# Patient Record
Sex: MALE | Race: WHITE | Employment: FULL TIME | URBAN - METROPOLITAN AREA
[De-identification: names, ages, dates, MRNs, and addresses within clinical notes are randomized per-mention and may not be internally consistent; named-entity substitution may affect disease eponyms.]

---

## 2019-11-03 ENCOUNTER — HOSPITAL ENCOUNTER (EMERGENCY)
Facility: CLINIC | Age: 39
Discharge: HOME | End: 2019-11-03
Attending: EMERGENCY MEDICINE | Admitting: EMERGENCY MEDICINE
Payer: COMMERCIAL

## 2019-11-03 VITALS
HEART RATE: 66 BPM | TEMPERATURE: 98.1 F | WEIGHT: 216.49 LBS | OXYGEN SATURATION: 98 % | SYSTOLIC BLOOD PRESSURE: 140 MMHG | RESPIRATION RATE: 18 BRPM | DIASTOLIC BLOOD PRESSURE: 80 MMHG

## 2019-11-03 DIAGNOSIS — Z23 RABIES, NEED FOR PROPHYLACTIC VACCINATION AGAINST: Primary | ICD-10-CM

## 2019-11-03 PROCEDURE — 90375 RABIES IG IM/SC: CPT | Performed by: EMERGENCY MEDICINE

## 2019-11-03 PROCEDURE — 96372 THER/PROPH/DIAG INJ SC/IM: CPT

## 2019-11-03 PROCEDURE — 25000128 H RX IP 250 OP 636: Performed by: EMERGENCY MEDICINE

## 2019-11-03 PROCEDURE — 90675 RABIES VACCINE IM: CPT | Performed by: EMERGENCY MEDICINE

## 2019-11-03 PROCEDURE — 99284 EMERGENCY DEPT VISIT MOD MDM: CPT | Mod: 25

## 2019-11-03 PROCEDURE — 90471 IMMUNIZATION ADMIN: CPT

## 2019-11-03 RX ADMIN — RABIES IMMUNE GLOBULIN (HUMAN) 1950 UNITS: 300 INJECTION, SOLUTION INFILTRATION; INTRAMUSCULAR at 15:27

## 2019-11-03 RX ADMIN — Medication 1 ML: at 15:28

## 2019-11-03 ASSESSMENT — ENCOUNTER SYMPTOMS: WOUND: 1

## 2019-11-03 NOTE — ED AVS SNAPSHOT
[facility] Emergency Department  [address]  [facility] [address]  Phone:  [phone]  Fax:  [phone]                                    Vel Layton   MRN: [id number]    Department:  [facility] Emergency Department   Date of Visit:  11/3/2019           After Visit Summary Signature Page    I have received my discharge instructions, and my questions have been answered. I have discussed any challenges I see with this plan with the nurse or doctor.    ..........................................................................................................................................  Patient/Patient Representative Signature      ..........................................................................................................................................  Patient Representative Print Name and Relationship to Patient    ..................................................               ................................................  Date                                   Time    ..........................................................................................................................................  Reviewed by Signature/Title    ...................................................              ..............................................  Date                                               Time          22EPIC Rev 08/18

## 2019-11-03 NOTE — ED TRIAGE NOTES
Patient was bitten on right hand by a dog yesterday. Was seen at urgent care and had wound taken care of, denied getting Rabies vaccination but is here today because he changed his mind. Did get tetanus vaccine yesterday and was started on Augmentin. ABCs intact.

## 2019-11-03 NOTE — ED PROVIDER NOTES
History     Chief Complaint:  Dog Bite    HPI   Vel Layton is a 39 year old right hand dominant male who presents for evaluation after a dog bite. Yesterday, the patient was at a dog park with his dog and while there an unknown pit bull became aggressive with his dog. The patient attempted to break up the fight, and even though he was wearing leather gloves, the patient was bit on the right hand and sustained wounds. He reports that the women and her pit bull had left before he was able to confirm whether the dog was immunized. Due to his wounds the patient presented to the urgency room yesterday, and at that time his tetanus was updated and he was started on Augmentin. At that time the patient did not want rabies vaccine. However, today the patient decided he wanted to get the rabies vaccine today after thinking about it at home which prompted his presentation. The patient states he has been cleaning and bandaging his wounds as advised and is taking Augmentin as prescribed.    Allergies:  NKDA     Medications:   The patient is currently on no regular medications.      Past Medical History:    The patient denies any significant past medical history.    Past Surgical History:    The patient does not have any pertinent past surgical history  Family History:    No past pertinent family history.     Social History:  Patient presents alone  Marital Status:   [2]     Review of Systems   Skin: Positive for wound.   All other systems reviewed and are negative.    Physical Exam     Patient Vitals for the past 24 hrs:   BP Temp Temp src Pulse Resp SpO2 Weight   11/03/19 1422 (!) 140/80 98.1  F (36.7  C) Oral 66 18 98 % 98.2 kg (216 lb 7.9 oz)     Physical Exam  General: Resting comfortably on the gurney  Eyes:  The pupils are equal and round    Conjunctivae and sclerae are normal  ENT:    Moist mucous membranes  Neck:  Normal range of motion  CV:  Regular rate and rhythm    Skin warm and well perfused   Resp:  Non  labored breathing on room air   MS:  Normal muscular tone  Skin:  Small superficial wound on palm of right hand and over thenar emminence on right hand with no erythema, drainage  Neuro:   Awake, alert.      Speech is normal and fluent.    Face is symmetric.     Moves all extremities equally  Psych: Normal affect.  Appropriate interactions.    Emergency Department Course   Interventions:  1527 rabies immune globulin 6.5 mL IM  1528 Rabies vaccine 1 mL IM    Emergency Department Course:  Nursing notes and vitals reviewed. (1445) I performed an exam of the patient as documented above.      Medicine administered as documented above.     (1536) I rechecked the patient.     Findings and plan explained to the Patient. Patient discharged home with instructions regarding supportive care, medications, and reasons to return. The importance of close follow-up was reviewed     I personally answered all related questions prior to discharge.       Impression & Plan    Medical Decision Making:  Vel Layton is a 39 year old male who presented to the emergency department with need for rabies vaccine.  Started on oral antibiotics yesterday and there are no signs of infection at this time.  He decided he wanted to do rabies vaccine and so he was given this and immunoglobin today.  Appointment was made for him to come back to Peds department on day 3 for the next vaccine and I placed therapy orders/vaccine orders for future vaccines. Understands to continue to watch for signs of infection.    Diagnosis:    ICD-10-CM    1. Rabies, need for prophylactic vaccination against Z23      Disposition:  discharged to home    Scribe Disclosure:  I, Yaneli Espinoza, am serving as a scribe on 11/3/2019 at 2:29 PM to personally document services performed by Alysa Rao MD based on my observations and the provider's statements to me.     Yaneli Espinoza  11/3/2019   [facility] EMERGENCY DEPARTMENT       Alysa Rao,  MD  11/03/19 3437

## 2019-11-06 ENCOUNTER — HOSPITAL ENCOUNTER (OUTPATIENT)
Dept: OUTPATIENT PROCEDURES | Facility: CLINIC | Age: 39
Discharge: HOME | End: 2019-11-06
Attending: EMERGENCY MEDICINE | Admitting: EMERGENCY MEDICINE
Payer: COMMERCIAL

## 2019-11-06 VITALS
OXYGEN SATURATION: 100 % | RESPIRATION RATE: 18 BRPM | SYSTOLIC BLOOD PRESSURE: 122 MMHG | HEART RATE: 76 BPM | DIASTOLIC BLOOD PRESSURE: 70 MMHG | TEMPERATURE: 97.7 F

## 2019-11-06 PROCEDURE — 90471 IMMUNIZATION ADMIN: CPT

## 2019-11-06 PROCEDURE — 96372 THER/PROPH/DIAG INJ SC/IM: CPT

## 2019-11-06 PROCEDURE — 25000128 H RX IP 250 OP 636: Performed by: EMERGENCY MEDICINE

## 2019-11-06 PROCEDURE — 90675 RABIES VACCINE IM: CPT | Performed by: EMERGENCY MEDICINE

## 2019-11-06 RX ADMIN — Medication 1 ML: at 18:21

## 2019-11-07 NOTE — PROGRESS NOTES
Arrived for Rabies injection.  Alert and ambulatory.  Denies any changes in health or medications since last visit.  Tolerated injection.  Aware of next appointment.  Discharged home in care of self.

## 2019-11-11 ENCOUNTER — HOSPITAL ENCOUNTER (OUTPATIENT)
Dept: OUTPATIENT PROCEDURES | Facility: CLINIC | Age: 39
Discharge: HOME | End: 2019-11-11
Attending: EMERGENCY MEDICINE | Admitting: EMERGENCY MEDICINE
Payer: COMMERCIAL

## 2019-11-11 VITALS
DIASTOLIC BLOOD PRESSURE: 77 MMHG | RESPIRATION RATE: 16 BRPM | SYSTOLIC BLOOD PRESSURE: 125 MMHG | TEMPERATURE: 97.8 F | HEART RATE: 72 BPM | OXYGEN SATURATION: 100 %

## 2019-11-11 PROCEDURE — 25000128 H RX IP 250 OP 636: Performed by: EMERGENCY MEDICINE

## 2019-11-11 PROCEDURE — 96372 THER/PROPH/DIAG INJ SC/IM: CPT

## 2019-11-11 PROCEDURE — 90471 IMMUNIZATION ADMIN: CPT

## 2019-11-11 PROCEDURE — 90675 RABIES VACCINE IM: CPT | Performed by: EMERGENCY MEDICINE

## 2019-11-11 RX ADMIN — Medication 1 ML: at 17:48

## 2019-11-19 ENCOUNTER — HOSPITAL ENCOUNTER (OUTPATIENT)
Dept: OUTPATIENT PROCEDURES | Facility: CLINIC | Age: 39
Discharge: HOME | End: 2019-11-19
Attending: EMERGENCY MEDICINE | Admitting: EMERGENCY MEDICINE
Payer: COMMERCIAL

## 2019-11-19 VITALS
DIASTOLIC BLOOD PRESSURE: 83 MMHG | HEART RATE: 60 BPM | SYSTOLIC BLOOD PRESSURE: 127 MMHG | RESPIRATION RATE: 18 BRPM | OXYGEN SATURATION: 100 % | TEMPERATURE: 97.9 F

## 2019-11-19 PROCEDURE — 90471 IMMUNIZATION ADMIN: CPT

## 2019-11-19 PROCEDURE — 25000128 H RX IP 250 OP 636: Performed by: EMERGENCY MEDICINE

## 2019-11-19 PROCEDURE — 90675 RABIES VACCINE IM: CPT | Performed by: EMERGENCY MEDICINE

## 2019-11-19 PROCEDURE — 96372 THER/PROPH/DIAG INJ SC/IM: CPT

## 2019-11-19 RX ADMIN — Medication 1 ML: at 18:32

## 2019-11-20 NOTE — PROGRESS NOTES
Arrived for rabies injection.  Alert and ambulatory.  Denies any changes in health or medications since last visit.  Tolerated injection. Rabies series complete with this injection. Discharged home in care of self.

## 2024-03-12 ENCOUNTER — LAB REQUISITION (OUTPATIENT)
Dept: LAB | Facility: CLINIC | Age: 44
End: 2024-03-12
Payer: COMMERCIAL

## 2024-03-12 DIAGNOSIS — L72.0 EPIDERMAL CYST: ICD-10-CM

## 2024-03-12 PROCEDURE — 88304 TISSUE EXAM BY PATHOLOGIST: CPT | Mod: 26 | Performed by: DERMATOLOGY

## 2024-03-12 PROCEDURE — 88304 TISSUE EXAM BY PATHOLOGIST: CPT | Mod: TC,ORL | Performed by: DERMATOLOGY

## 2024-03-15 LAB
PATH REPORT.COMMENTS IMP SPEC: NORMAL
PATH REPORT.COMMENTS IMP SPEC: NORMAL
PATH REPORT.FINAL DX SPEC: NORMAL
PATH REPORT.GROSS SPEC: NORMAL
PATH REPORT.MICROSCOPIC SPEC OTHER STN: NORMAL
PATH REPORT.RELEVANT HX SPEC: NORMAL